# Patient Record
Sex: FEMALE | Race: OTHER | Employment: UNEMPLOYED | URBAN - METROPOLITAN AREA
[De-identification: names, ages, dates, MRNs, and addresses within clinical notes are randomized per-mention and may not be internally consistent; named-entity substitution may affect disease eponyms.]

---

## 2017-03-30 ENCOUNTER — HOSPITAL ENCOUNTER (EMERGENCY)
Age: 57
Discharge: HOME | End: 2017-03-31
Attending: EMERGENCY MEDICINE
Payer: SELF-PAY

## 2017-03-30 ENCOUNTER — APPOINTMENT (OUTPATIENT)
Dept: GENERAL RADIOLOGY | Age: 57
End: 2017-03-30
Attending: EMERGENCY MEDICINE
Payer: SELF-PAY

## 2017-03-30 DIAGNOSIS — J11.1 INFLUENZA: Primary | ICD-10-CM

## 2017-03-30 LAB
ALBUMIN SERPL BCP-MCNC: 3.8 G/DL (ref 3.5–5)
ALBUMIN/GLOB SERPL: 0.9 {RATIO} (ref 1.2–3.5)
ALP SERPL-CCNC: 88 U/L (ref 50–136)
ALT SERPL-CCNC: 37 U/L (ref 12–65)
ANION GAP BLD CALC-SCNC: 11 MMOL/L (ref 7–16)
AST SERPL W P-5'-P-CCNC: 37 U/L (ref 15–37)
BILIRUB SERPL-MCNC: 0.4 MG/DL (ref 0.2–1.1)
BUN SERPL-MCNC: 10 MG/DL (ref 6–23)
CALCIUM SERPL-MCNC: 8.7 MG/DL (ref 8.3–10.4)
CHLORIDE SERPL-SCNC: 96 MMOL/L (ref 98–107)
CO2 SERPL-SCNC: 28 MMOL/L (ref 21–32)
CREAT SERPL-MCNC: 0.82 MG/DL (ref 0.6–1)
DIFFERENTIAL METHOD BLD: ABNORMAL
ERYTHROCYTE [DISTWIDTH] IN BLOOD BY AUTOMATED COUNT: 16.8 % (ref 11.9–14.6)
GLOBULIN SER CALC-MCNC: 4.1 G/DL (ref 2.3–3.5)
GLUCOSE SERPL-MCNC: 117 MG/DL (ref 65–100)
HCT VFR BLD AUTO: 38.5 % (ref 35.8–46.3)
HGB BLD-MCNC: 12.8 G/DL (ref 11.7–15.4)
LACTATE BLD-SCNC: 1 MMOL/L (ref 0.5–1.9)
LYMPHOCYTES # BLD: 1.2 K/UL (ref 0.5–4.6)
LYMPHOCYTES NFR BLD MANUAL: 16 % (ref 16–44)
MCH RBC QN AUTO: 23.2 PG (ref 26.1–32.9)
MCHC RBC AUTO-ENTMCNC: 33.2 G/DL (ref 31.4–35)
MCV RBC AUTO: 69.9 FL (ref 79.6–97.8)
MONOCYTES # BLD: 1.1 K/UL (ref 0.1–1.3)
MONOCYTES NFR BLD MANUAL: 14 % (ref 3–9)
NEUTS SEG # BLD: 5.5 K/UL (ref 1.7–8.2)
NEUTS SEG NFR BLD MANUAL: 70 % (ref 47–75)
PLATELET # BLD AUTO: 221 K/UL (ref 150–450)
PLATELET COMMENTS,PCOM: ADEQUATE
PMV BLD AUTO: 10.6 FL (ref 10.8–14.1)
POTASSIUM SERPL-SCNC: 3.1 MMOL/L (ref 3.5–5.1)
PROT SERPL-MCNC: 7.9 G/DL (ref 6.3–8.2)
RBC # BLD AUTO: 5.51 M/UL (ref 4.05–5.25)
RBC MORPH BLD: ABNORMAL
SODIUM SERPL-SCNC: 135 MMOL/L (ref 136–145)
TROPONIN I BLD-MCNC: 0 NG/ML (ref 0–0.08)
WBC # BLD AUTO: 7.8 K/UL (ref 4.3–11.1)

## 2017-03-30 PROCEDURE — 96361 HYDRATE IV INFUSION ADD-ON: CPT | Performed by: EMERGENCY MEDICINE

## 2017-03-30 PROCEDURE — 85025 COMPLETE CBC W/AUTO DIFF WBC: CPT | Performed by: EMERGENCY MEDICINE

## 2017-03-30 PROCEDURE — 80053 COMPREHEN METABOLIC PANEL: CPT | Performed by: EMERGENCY MEDICINE

## 2017-03-30 PROCEDURE — 83605 ASSAY OF LACTIC ACID: CPT

## 2017-03-30 PROCEDURE — 71020 XR CHEST PA LAT: CPT

## 2017-03-30 PROCEDURE — 74011250636 HC RX REV CODE- 250/636: Performed by: EMERGENCY MEDICINE

## 2017-03-30 PROCEDURE — 99284 EMERGENCY DEPT VISIT MOD MDM: CPT | Performed by: EMERGENCY MEDICINE

## 2017-03-30 PROCEDURE — 96374 THER/PROPH/DIAG INJ IV PUSH: CPT | Performed by: EMERGENCY MEDICINE

## 2017-03-30 PROCEDURE — 93005 ELECTROCARDIOGRAM TRACING: CPT | Performed by: EMERGENCY MEDICINE

## 2017-03-30 PROCEDURE — 84484 ASSAY OF TROPONIN QUANT: CPT

## 2017-03-30 PROCEDURE — 87040 BLOOD CULTURE FOR BACTERIA: CPT | Performed by: EMERGENCY MEDICINE

## 2017-03-30 RX ORDER — HYDROCODONE BITARTRATE AND HOMATROPINE METHYLBROMIDE ORAL SOLUTION 5; 1.5 MG/5ML; MG/5ML
5 LIQUID ORAL
Qty: 120 ML | Refills: 0 | Status: SHIPPED | OUTPATIENT
Start: 2017-03-30

## 2017-03-30 RX ORDER — KETOROLAC TROMETHAMINE 30 MG/ML
30 INJECTION, SOLUTION INTRAMUSCULAR; INTRAVENOUS
Status: COMPLETED | OUTPATIENT
Start: 2017-03-30 | End: 2017-03-30

## 2017-03-30 RX ORDER — DICLOFENAC SODIUM 75 MG/1
75 TABLET, DELAYED RELEASE ORAL 2 TIMES DAILY
Qty: 10 TAB | Refills: 0 | Status: SHIPPED | OUTPATIENT
Start: 2017-03-30

## 2017-03-30 RX ADMIN — KETOROLAC TROMETHAMINE 30 MG: 30 INJECTION, SOLUTION INTRAMUSCULAR at 22:46

## 2017-03-30 RX ADMIN — SODIUM CHLORIDE 1000 ML: 900 INJECTION, SOLUTION INTRAVENOUS at 22:46

## 2017-03-31 VITALS
HEIGHT: 62 IN | WEIGHT: 210 LBS | DIASTOLIC BLOOD PRESSURE: 61 MMHG | OXYGEN SATURATION: 93 % | SYSTOLIC BLOOD PRESSURE: 123 MMHG | RESPIRATION RATE: 20 BRPM | TEMPERATURE: 98.4 F | BODY MASS INDEX: 38.64 KG/M2

## 2017-03-31 LAB
ATRIAL RATE: 96 BPM
CALCULATED P AXIS, ECG09: 53 DEGREES
CALCULATED R AXIS, ECG10: 27 DEGREES
CALCULATED T AXIS, ECG11: 52 DEGREES
DIAGNOSIS, 93000: NORMAL
P-R INTERVAL, ECG05: 128 MS
Q-T INTERVAL, ECG07: 322 MS
QRS DURATION, ECG06: 80 MS
QTC CALCULATION (BEZET), ECG08: 406 MS
VENTRICULAR RATE, ECG03: 96 BPM

## 2017-03-31 NOTE — ED NOTES
I have reviewed discharge instructions with the patient. The patient verbalized understanding. pt has  in room she states pt tito understands.

## 2017-03-31 NOTE — DISCHARGE INSTRUCTIONS
Influenza (gripe): Instrucciones de cuidado - [ Influenza (Flu): Care Instructions ]  Instrucciones de cuidado  La influenza (gripe) es monica infección de los pulmones y las vías respiratorias. Es causada por el virus de la influenza. Hay diferentes cepas o tipos de virus de la gripe de un año a otro. A diferencia del resfriado común, la gripe se presenta de [place] repentina y [address], tales natan tos, [place], [place], escalofríos, fatiga y [place], son más intensos. Estos síntomas pueden durar hasta 10 días. Aunque la gripe puede hacerle sentirse muy enfermo, por lo general no causa problemas serios de nikolay. Por lo general, todo lo que necesita para los síntomas de la gripe es tratamiento en casa. Brigitte mcdermott médico podría recetarle algún medicamento antiviral para prevenir otros problemas de nikolay, natan la neumonía. [employer] y quienes tienen un problema de nikolay prolongado, natan monica enfermedad pulmonar, tienen el mayor riesgo de desarrollar neumonía u otros problemas de [place]. La atención de seguimiento es monica parte clave de mcdermott tratamiento y seguridad. Asegúrese de hacer y acudir a todas las citas, y llame a mcdermott médico si está teniendo problemas. También es monica buena idea saber los resultados de los exámenes y mantener monica lista de los medicamentos que michael. ¿Cómo puede cuidarse en el hogar? · Descanse bastante. · Ling abundantes líquidos, suficientes para que mcdermott orina sea de color amarillo brii o transparente natan el agua. Si tiene monica enfermedad del riñón, del corazón o del hígado y tiene que [employer] líquidos, hable con mcdermott médico antes de aumentar mcdermott consumo. · Si es necesario, tome un analgésico (medicamento para el dolor) de venta cecilio, natan acetaminofén (Tylenol), ibuprofeno (Advil, Motrin) o naproxeno (Aleve), para [employer] fiebre, el dolor de [place] y los smith musculares. Ami y siga todas las instrucciones de la [place].  Ninguna persona geneva de 20 años debe anthony aspirina. Ésta ha sido relacionada con el síndrome de [place], monica enfermedad grave. · No fume. Fumar puede empeorar la gripe. Si necesita ayuda para dejar de fumar, hable con mcdermott médico [employer] y medicamentos para dejar de fumar. Éstos pueden aumentar rae probabilidades de dejar el hábito para siempre. · Para ayudar a despejar la nariz congestionada, respire aire húmedo de [place] caliente o un lavabo lleno de [place]. · Antes de usar medicamentos para la tos y los resfriados, revise la [place]. Estos medicamentos podrían no ser seguros para los niños pequeños o las personas con ciertos problemas de [place]. · Si le duele la piel alrededor de la nariz y los labios, aplique un poco de vaselina en la herb. · Para aliviar la tos:  Martínez Forester líquidos para aliviar la comezón de garganta. ¨ Chupe pastillas para la tos o caramelos duros comunes. ¨ [place] un medicamento para la tos de venta cecilio que contenga dextrometorfano para ayudarle a dormir. Ami y siga todas las instrucciones de la [place]. ¨ Use monica almohada extra en la noche para levantar más la [place]. [place] podría ayudarlo a descansar si la tos lo mantiene despierto. · [employer] medicamentos recetados exactamente natan le fueron recetados. Llame a mcdermott médico si valeria estar teniendo problemas con mcdermott medicamento. Cómo evitar propagar la gripe  · [employer] irene con regularidad y manténgalas alejadas de mcdermott alejandro. · No vaya a la escuela, al [employer] o a otros lugares públicos hasta que se sienta mejor y mcdermott fiebre haya desaparecido por al menos 24 horas. La fiebre debe elena desaparecido por sí misma, sin la ayuda de medicamentos. · Pida a las personas que viven con usted que hablen con rae médicos sobre la prevención de la gripe. Marlon vez les den algún medicamento antiviral para no contraer mcdermott gripe. · Para prevenir tener la gripe en el futuro, hágase poner la vacuna contra la gripe cada otoño.  Anime a las personas que viven en mcdermott casa a ponerse la vacuna. · Cúbrase la boca al toser o estornudar. ¿Cuándo debe pedir ayuda? Llame al 911 en cualquier momento que considere que necesita atención de emergencia. Por ejemplo, llame si:  · [address] dificultades para respirar. Llame a mcdermott médico ahora mismo o busque atención médica inmediata si:  · Tiene nueva o mayor dificultad para respirar. · Le parece que está mucho más enfermo. · Se siente muy somnoliento (con sueño) o confuso. · Tiene fiebre nueva o más luis. · Tiene un salpullido nuevo. Preste especial atención a los cambios en mcdermott nikolay y asegúrese de comunicarse con mcdermott médico si:  · Lucien Cade a mejorar y después empeora otra vez. · No está mejorando después de 1 semana. ¿Dónde puede encontrar más información en inglés? Jyothi Winn a http://antione-neal.info/. Maylin Link D290 en la búsqueda para aprender más acerca de \"Influenza (gripe): Instrucciones de cuidado - [ Influenza (Flu): Care Instructions ]. \"  Revisado: [address] [place], 2016  Versión del contenido: 11.2  © 2291-6455 Healthwise, Incorporated. Las instrucciones de cuidado fueron adaptadas bajo licencia por [employer] (which disclaims liability or warranty for this information). Si usted tiene [place] afección médica o sobre estas instrucciones, siempre pregunte a mcdermott profesional de nikolay. Healthwise, Incorporated niega toda garantía o responsabilidad por mcdermott uso de esta información.

## 2017-03-31 NOTE — ED TRIAGE NOTES
Recently treat for the flu, and now has a cough and chest pain.    Friend from [employer] is translating

## 2017-03-31 NOTE — ED PROVIDER NOTES
HPI Comments: 79-year-old  female was diagnosed with the flu 3 days ago. She has had cough congestion fever and diffuse aching. She is complaining of soreness to her chest with coughing. Also reports occasional vomiting and diarrhea. No focal abdominal pain. She was placed on Tamiflu and cough medicine. Patient is a 61 y.o. female presenting with chest pain. The history is provided by the patient. Chest Pain (Angina)    Associated symptoms include cough, a fever and vomiting. Pertinent negatives include no abdominal pain, no back pain and no headaches. Past Medical History:   Diagnosis Date    Arthritis        No past surgical history on file. No family history on file. Social History     Social History    Marital status: N/A     Spouse name: N/A    Number of children: N/A    Years of education: N/A     Occupational History    Not on file. Social History Main Topics    Smoking status: Not on file    Smokeless tobacco: Not on file    Alcohol use Not on file    Drug use: Not on file    Sexual activity: Not on file     Other Topics Concern    Not on file     Social History Narrative    No narrative on file         ALLERGIES: Review of patient's allergies indicates no known allergies. Review of Systems   Constitutional: Positive for chills and fever. HENT: Positive for congestion. Respiratory: Positive for cough. Cardiovascular: Positive for chest pain. Gastrointestinal: Positive for diarrhea and vomiting. Negative for abdominal pain. Genitourinary: Negative for dysuria. Musculoskeletal: Negative for back pain and neck pain. Skin: Negative for rash. Neurological: Negative for headaches. Vitals:    03/30/17 2217   BP: 174/74   Resp: 22   Temp: 98.2 °F (36.8 °C)   SpO2: 96%   Weight: 95.3 kg (210 lb)   Height: 5' 2\" (1.575 m)            Physical Exam   Constitutional: She is oriented to person, place, and time.  She appears well-developed and well-nourished. No distress. HENT:   Head: Normocephalic and atraumatic. Mouth/Throat: Oropharynx is clear and moist.   Eyes: Conjunctivae are normal. Pupils are equal, round, and reactive to light. No scleral icterus. Neck: Normal range of motion. Neck supple. Cardiovascular: Normal rate and regular rhythm. No murmur heard. Pulmonary/Chest: Effort normal and breath sounds normal. She has no wheezes. Abdominal: Soft. She exhibits no distension. There is no tenderness. Musculoskeletal: Normal range of motion. Neurological: She is alert and oriented to person, place, and time. Skin: Skin is warm and dry. Psychiatric: She has a normal mood and affect. Nursing note and vitals reviewed. MDM  Number of Diagnoses or Management Options  Diagnosis management comments: Work reveals a normal white blood count, lactic acid and troponin. Chest x-ray is normal.  Patient was hydrated with normal saline and treated with Toradol. He is feeling bit better. Symptoms at this time are consistent with myalgias and arthralgias related to influenza. We'll place her on Voltaren for pain and Hycodan for cough.        Amount and/or Complexity of Data Reviewed  Clinical lab tests: ordered and reviewed  Tests in the radiology section of CPT®: ordered and reviewed  Tests in the medicine section of CPT®: ordered and reviewed  Independent visualization of images, tracings, or specimens: yes    Risk of Complications, Morbidity, and/or Mortality  Presenting problems: moderate  Diagnostic procedures: moderate  Management options: moderate      ED Course       Procedures

## 2017-04-04 LAB
BACTERIA SPEC CULT: NORMAL
SERVICE CMNT-IMP: NORMAL